# Patient Record
Sex: FEMALE
[De-identification: names, ages, dates, MRNs, and addresses within clinical notes are randomized per-mention and may not be internally consistent; named-entity substitution may affect disease eponyms.]

---

## 2018-10-11 LAB
BASOPHILS # BLD AUTO: 0 10*3/UL (ref 0–0.1)
BASOPHILS NFR BLD AUTO: 0.4 % (ref 0–1)
DEPRECATED NEUTROPHILS # BLD AUTO: 3.5 10*3/UL (ref 2.1–6.9)
EOSINOPHIL # BLD AUTO: 0.4 10*3/UL (ref 0–0.4)
EOSINOPHIL NFR BLD AUTO: 5 % (ref 0–6)
ERYTHROCYTE [DISTWIDTH] IN CORD BLOOD: 12.4 % (ref 11.7–14.4)
HCT VFR BLD AUTO: 41.1 % (ref 34.2–44.1)
HGB BLD-MCNC: 13.5 G/DL (ref 12–16)
LYMPHOCYTES # BLD: 2.7 10*3/UL (ref 1–3.2)
LYMPHOCYTES NFR BLD AUTO: 38.7 % (ref 18–39.1)
MCH RBC QN AUTO: 29.8 PG (ref 28–32)
MCHC RBC AUTO-ENTMCNC: 32.8 G/DL (ref 31–35)
MCV RBC AUTO: 90.7 FL (ref 81–99)
MONOCYTES # BLD AUTO: 0.5 10*3/UL (ref 0.2–0.8)
MONOCYTES NFR BLD AUTO: 6.4 % (ref 4.4–11.3)
NEUTS SEG NFR BLD AUTO: 49.2 % (ref 38.7–80)
PLATELET # BLD AUTO: 180 X10E3/UL (ref 140–360)
RBC # BLD AUTO: 4.53 X10E6/UL (ref 3.6–5.1)

## 2018-10-18 ENCOUNTER — HOSPITAL ENCOUNTER (OUTPATIENT)
Dept: HOSPITAL 88 - OR | Age: 73
Discharge: HOME | End: 2018-10-18
Attending: INTERNAL MEDICINE
Payer: MEDICARE

## 2018-10-18 VITALS — SYSTOLIC BLOOD PRESSURE: 134 MMHG | DIASTOLIC BLOOD PRESSURE: 63 MMHG

## 2018-10-18 DIAGNOSIS — Z71.3: ICD-10-CM

## 2018-10-18 DIAGNOSIS — Z87.440: ICD-10-CM

## 2018-10-18 DIAGNOSIS — K57.30: ICD-10-CM

## 2018-10-18 DIAGNOSIS — Z80.0: ICD-10-CM

## 2018-10-18 DIAGNOSIS — E11.9: ICD-10-CM

## 2018-10-18 DIAGNOSIS — Z01.812: ICD-10-CM

## 2018-10-18 DIAGNOSIS — K44.9: ICD-10-CM

## 2018-10-18 DIAGNOSIS — E66.9: ICD-10-CM

## 2018-10-18 DIAGNOSIS — Z79.82: ICD-10-CM

## 2018-10-18 DIAGNOSIS — F32.9: ICD-10-CM

## 2018-10-18 DIAGNOSIS — I10: ICD-10-CM

## 2018-10-18 DIAGNOSIS — Z01.810: ICD-10-CM

## 2018-10-18 DIAGNOSIS — M06.9: ICD-10-CM

## 2018-10-18 DIAGNOSIS — K64.8: ICD-10-CM

## 2018-10-18 DIAGNOSIS — K21.0: ICD-10-CM

## 2018-10-18 DIAGNOSIS — K29.70: Primary | ICD-10-CM

## 2018-10-18 DIAGNOSIS — K52.9: ICD-10-CM

## 2018-10-18 DIAGNOSIS — Z79.4: ICD-10-CM

## 2018-10-18 DIAGNOSIS — I48.91: ICD-10-CM

## 2018-10-18 PROCEDURE — 45380 COLONOSCOPY AND BIOPSY: CPT

## 2018-10-18 PROCEDURE — 85025 COMPLETE CBC W/AUTO DIFF WBC: CPT

## 2018-10-18 PROCEDURE — 43239 EGD BIOPSY SINGLE/MULTIPLE: CPT

## 2018-10-18 PROCEDURE — 82948 REAGENT STRIP/BLOOD GLUCOSE: CPT

## 2018-10-18 PROCEDURE — 45378 DIAGNOSTIC COLONOSCOPY: CPT

## 2018-10-18 PROCEDURE — 36415 COLL VENOUS BLD VENIPUNCTURE: CPT

## 2018-10-18 PROCEDURE — 93005 ELECTROCARDIOGRAM TRACING: CPT

## 2018-10-18 NOTE — XMS REPORT
Patient Summary Document

                             Created on: 10/18/2018



MAURICIO FREEMAN

External Reference #: [id number]

: 1945

Sex: Female



Demographics







                          Address                   [address] [place] [address] 

[place]  [address]

 

                          Home Phone                [phone]

 

                          Preferred Language        Unknown

 

                          Marital Status            Unknown

 

                          Advent Affiliation     Unknown

 

                          Race                      Unknown

 

                          Ethnic Group              Unknown





Author







                          Author                    [facility]

 

                          Address                   Unknown

 

                          Phone                     Unavailable







Support







                Name            Relationship    Address         Phone

 

                    JR FREEMAN     PRS                 [address] [place]  [address] [phone]

 

                    NGUYỄN FREEMAN    PRS                 UNKNOWN

[place], [place]  [id number] [phone]







Care Team Providers







                    Care Team Member Name    Role                Phone

 

                          Unavailable               Unavailable







Payers







             Payer Name    Policy Type    Policy Number    Effective Date    Expiration Date







Problems

This patient has no known problems.



Allergies, Adverse Reactions, Alerts







          Allergy Name    Allergy Type    Status    Severity    Reaction(s)    Onset Date    Inactive 

Date                      Treating Clinician        Comments

 

        No Known Allergies    DA      Active    U               2018 00:00:00                     







Medications

This patient has no known medications.